# Patient Record
Sex: MALE | Race: WHITE | Employment: FULL TIME | URBAN - METROPOLITAN AREA
[De-identification: names, ages, dates, MRNs, and addresses within clinical notes are randomized per-mention and may not be internally consistent; named-entity substitution may affect disease eponyms.]

---

## 2022-08-24 ENCOUNTER — HOSPITAL ENCOUNTER (OUTPATIENT)
Dept: INFUSION THERAPY | Age: 62
Discharge: HOME | End: 2022-08-24
Payer: COMMERCIAL

## 2022-08-24 VITALS
TEMPERATURE: 98.4 F | SYSTOLIC BLOOD PRESSURE: 137 MMHG | RESPIRATION RATE: 18 BRPM | DIASTOLIC BLOOD PRESSURE: 80 MMHG | HEART RATE: 72 BPM | OXYGEN SATURATION: 97 %

## 2022-08-24 PROCEDURE — 6360000002 HC RX W HCPCS: Performed by: INTERNAL MEDICINE

## 2022-08-24 PROCEDURE — M0220 HC TIXAGEV AND CILGAV INJ: HCPCS

## 2022-08-24 RX ADMIN — Medication 300 MG: at 08:40

## 2022-08-24 RX ADMIN — Medication 300 MG: at 08:39

## 2022-08-24 NOTE — PROGRESS NOTES
Arrived to the [facility]. Evushield completed. Provided education on Evushield (written info given)    Patient instructed to report any side affects to ordering provider. Patient tolerated well. Monitored x 1 hour, no adverse reactions  noted. Any issues or concerns during appointment: none. Patient has no future infusion appointment scheduled at this time  Discharged ambulatory, no distress noted.  Patient instructed to call provider with temperature of 100.4 or greater or nausea/vomiting/ diarrhea or pain not controlled by medications